# Patient Record
Sex: MALE | Race: WHITE
[De-identification: names, ages, dates, MRNs, and addresses within clinical notes are randomized per-mention and may not be internally consistent; named-entity substitution may affect disease eponyms.]

---

## 2019-06-12 ENCOUNTER — HOSPITAL ENCOUNTER (EMERGENCY)
Dept: HOSPITAL 91 - E/R | Age: 69
Discharge: HOME | End: 2019-06-12
Payer: MEDICARE

## 2019-06-12 ENCOUNTER — HOSPITAL ENCOUNTER (EMERGENCY)
Dept: HOSPITAL 10 - E/R | Age: 69
Discharge: HOME | End: 2019-06-12
Payer: MEDICARE

## 2019-06-12 VITALS
BODY MASS INDEX: 40.18 KG/M2 | BODY MASS INDEX: 40.18 KG/M2 | WEIGHT: 280.65 LBS | WEIGHT: 280.65 LBS | HEIGHT: 70 IN | HEIGHT: 70 IN

## 2019-06-12 VITALS — HEART RATE: 87 BPM | RESPIRATION RATE: 20 BRPM | DIASTOLIC BLOOD PRESSURE: 68 MMHG | SYSTOLIC BLOOD PRESSURE: 133 MMHG

## 2019-06-12 DIAGNOSIS — F10.920: Primary | ICD-10-CM

## 2019-06-12 PROCEDURE — 99283 EMERGENCY DEPT VISIT LOW MDM: CPT

## 2019-06-12 NOTE — ERD
ER Documentation


Chief Complaint


Chief Complaint





pt. BIBA RA wife called 911, concerns of existing liver cirhosis





HPI


69-year-old male brought in via EMS.  A Bangladeshi  was used.  The 


patient states that he was drinking alcohol today.  EMS reports that the wife 


called 911 because when the patient drinks alcohol at home he acts out and the 


wife did not want to deal with him.  Patient himself admits to drinking alcohol.


 He denies any falls, head injury, no hematemesis or melena.  The patient has no


complaints currently.





ROS


All systems reviewed and are negative except as per history of present illness.





Medications


Home Meds


Reported Medications


[Gabapentin]   No Conflict Check


   4/4/13


[Fluocinonide]   No Conflict Check


   4/4/13


[Flector]   No Conflict Check


   4/4/13


[Exforge]   No Conflict Check


   4/4/13


[Effient]   No Conflict Check


   4/4/13


[Doxazosin]   No Conflict Check


   4/4/13


[Dexilant]   No Conflict Check


   4/4/13


[Creon]   No Conflict Check


   4/4/13


[clotrimazole]   No Conflict Check


   4/4/13


[Captopril]   No Conflict Check


   4/4/13


[Apridra Solostar]   No Conflict Check


   4/4/13


[ammonium lactate]   No Conflict Check


   4/4/13


[hydralazine]   No Conflict Check


   4/4/13


[Hctz]   No Conflict Check


   4/4/13


[Humalog]   No Conflict Check


   4/4/13





Allergies


Allergies:  


Coded Allergies:  


     No Known Allergy (Unverified , 4/4/13)





PMhx/Soc


History of Surgery:  Yes (BYPASS)


Anesthesia Reaction:  No


Hx Neurological Disorder:  No


Hx Respiratory Disorders:  No


Hx Cardiac Disorders:  No


Hx Psychiatric Problems:  No


Hx Miscellaneous Medical Probl:  No


Hx Alcohol Use:  Yes (ETOH, Vodka)


Hx Substance Use:  No


Hx Tobacco Use:  No


Smoking Status:  Never smoker





FmHx


Family History:  No diabetes





Physical Exam


Vitals





Vital Signs


  Date      Temp  Pulse  Resp  B/P (MAP)   Pulse Ox  O2          O2 Flow    FiO2


Time                                                 Delivery    Rate


   6/12/19  98.2    105    20      126/69        94


     18:09                           (88)





Physical Exam


General: Well developed, well nourished, no acute distress


Head: Normocephalic, atraumatic.


Eyes: Pupils equally reactive, EOM intact


ENT: Moist mucous membranes


Neck: Supple, no lymphadenopathy


Respiratory: Lungs clear bilaterally, no distress


Cardiovascular: RRR, no murmurs, rubs, or gallops


Abdominal: Soft, non-tender, non-distended, no peritoneal signs


: Deferred


MSK: No edema, no unilateral swelling, 5/5 strength


Neurologic: Alert and oriented, moving all extremities, normal speech, no focal 


weakness, no cerebellar signs


Skin: No rash


Psych: Normal mood





Procedures/MDM


EKG, MONITORS, & DIAGNOSTIC IMAGING:


[None Required]





PROCEDURES:


[None Required]








MEDICAL DECISION MAKING:


The patient's presentation is consistent with mild acute alcohol intoxication





I have a much lower clinical concern for clinically significant traumatic brain 


injury, meningitis, significant electrolyte disturbance.





The patient's workup will include a medical screening examination as well as 


observation for sobriety.





The patient's presentation is most consistent with acute alcohol intoxication 


leading to acute encephalopathy.  The patient is protecting their airway.  The 


patient has no signs or symptoms concerning for impending respiratory failure 


and does not require intubation at this time.  The patient will require 


observation in the emergency room to allow for metabolization.





Once the patient is able to ambulate on their own accord, navigate the community


the patient can be safely discharged from the emergency room.





ER COURSE:


* The patient's wife arrived to the emergency room.  Using an  it was


  explained to her that the patient has mild intoxication but has no evidence of


  emergent medical condition.  The patient can be safely discharged.  She states


  that she does not want to take care of him and left the emergency room.


* The patient only has mild intoxication without signs of endorgan dysfunction 


  or acute medical emergency.


* We do not have a discharge plan.   have been consulted.








CONSULTATION:


[None]





DISPOSITION PLAN:


Pending  for disposition





Departure


Diagnosis:  


   Primary Impression:  


   Alcohol intoxication


   Complication of substance-induced condition:  uncomplicated  Qualified Codes:


    F10.920 - Alcohol use, unspecified with intoxication, uncomplicated


Condition:  Stable


Patient Instructions:  Alcohol Abuse


Referrals:  


COMMUNITY CLINICS


YOU HAVE RECEIVED A MEDICAL SCREENING EXAM AND THE RESULTS INDICATE THAT YOU DO 


NOT HAVE A CONDITION THAT REQUIRES URGENT TREATMENT IN THE EMERGENCY DEPARTMENT.





FURTHER EVALUATION AND TREATMENT OF YOUR CONDITION CAN WAIT UNTIL YOU ARE SEEN 


IN YOUR DOCTORS OFFICE WITHIN THE NEXT 1-2 DAYS. IT IS YOUR RESPONSIBILITY TO 


MAKE AN APPOINTMENT FOR FOLOW-UP CARE.





IF YOU HAVE A PRIMARY DOCTOR


--you should call your primary doctor and schedule an appointment





IF YOU DO NOT HAVE A PRIMARY DOCTOR YOU CAN CALL OUR PHYSICIAN REFERRAL HOTLINE 


AT


 [phone] 





IF YOU CAN NOT AFFORD TO SEE A PHYSICIAN YOU CAN CHOSE FROM THE FOLLOWING 


[facility] [phone] [address] [facility]





[facility] [phone] [address] [facility]





[facility] [phone] [address] [facility] [phone] [address] [facility] [phone] [address] [facility]. [facility] [phone] [address] [facility] [facility]


YOU HAVE RECEIVED A MEDICAL SCREENING EXAM AND THE RESULTS INDICATE THAT YOU DO 


NOT HAVE A CONDITION THAT REQUIRES URGENT TREATMENT IN THE EMERGENCY DEPARTMENT.





FURTHER EVALUATION AND TREATMENT OF YOUR CONDITION CAN WAIT UNTIL YOU ARE SEEN 


IN YOUR DOCTORS OFFICE WITHIN THE NEXT 1-2 DAYS. IT IS YOUR RESPONSIBILITY TO 


MAKE AN APPOINTMENT FOR FOLOW-UP CARE.





IF YOU HAVE A PRIMARY DOCTOR


--you should call your primary doctor and schedule and appointment





IF YOU DO NOT HAVE A PRIMARY DOCTOR YOU CAN CALL OUR PHYSICIAN REFERRAL HOTLINE 


AT [phone].





IF YOU CAN NOT AFFORD TO SEE A PHYSICIAN YOU CAN CHOSE FROM THE FOLLOWING [facility]:





[facility]


[address] [place] [address]





[facility]


[address] [place] [address]





[facility]


[address] [place] [address]





Additional Instructions:  


Call your primary care doctor TOMORROW for an appointment during the next 1 


WEEK.Tell the  that you were referred from this facility.See the doctor


sooner or return here if your  condition worsens before your appointment time.











IVANA TALBERT MD          Jun 12, 2019 19:24

## 2022-05-26 ENCOUNTER — OFFICE (OUTPATIENT)
Dept: URBAN - METROPOLITAN AREA CLINIC 45 | Facility: CLINIC | Age: 72
End: 2022-05-26

## 2022-05-26 VITALS
DIASTOLIC BLOOD PRESSURE: 75 MMHG | SYSTOLIC BLOOD PRESSURE: 121 MMHG | HEART RATE: 74 BPM | WEIGHT: 220 LBS | HEIGHT: 67 IN

## 2022-05-26 DIAGNOSIS — E66.9 OBESITY: ICD-10-CM

## 2022-05-26 DIAGNOSIS — B18.2: ICD-10-CM

## 2022-05-26 DIAGNOSIS — Z95.5 STENTED CORONARY ARTERY: ICD-10-CM

## 2022-05-26 DIAGNOSIS — K91.5 POSTCHOLECYSTECTOMY STATE: ICD-10-CM

## 2022-05-26 DIAGNOSIS — Z98.890: ICD-10-CM

## 2022-05-26 PROCEDURE — 99215 OFFICE O/P EST HI 40 MIN: CPT | Performed by: INTERNAL MEDICINE

## 2022-05-26 NOTE — SERVICEHPINOTES
Patient presents for follow-up of chronic liver disease and liver transplant performed at [facility] in January 2020.  Patient was previously briefly seen by me during hospitalization for decompensated liver disease with refractory ascites in 2019. He was subsequently referred by me for liver transplant evaluation.  Patient has been routinely followed at [facility] hepatology, but would like to establish local GI care as well due to difficulties with transplantation and language barrier. There has been recent lab work and abdominal MRI/MRCP performed in January of this year, showing no acute changes. He reportedly had previous colon screening by another local GI a few years ago. The patient has no family history of GI or liver cancer. Patient has been also followed by cardiologist due to prior history of stented coronary artery disease. He denies fever, nausea, vomiting, dysphagia, reflux, abdominal pain, change in bowel habits, constipation, diarrhea, rectal bleeding, melena, and significant change in weight. Denies shortness of breath and chest pain.

## 2023-02-21 ENCOUNTER — HOSPITAL ENCOUNTER (INPATIENT)
Dept: HOSPITAL 12 - ER | Age: 73
LOS: 6 days | Discharge: HOME HEALTH | DRG: 177 | End: 2023-02-27
Payer: MEDICARE

## 2023-02-21 VITALS — DIASTOLIC BLOOD PRESSURE: 63 MMHG | SYSTOLIC BLOOD PRESSURE: 126 MMHG

## 2023-02-21 VITALS — BODY MASS INDEX: 27.94 KG/M2 | WEIGHT: 178 LBS | HEIGHT: 67 IN

## 2023-02-21 DIAGNOSIS — I12.9: ICD-10-CM

## 2023-02-21 DIAGNOSIS — R16.1: ICD-10-CM

## 2023-02-21 DIAGNOSIS — D61.818: ICD-10-CM

## 2023-02-21 DIAGNOSIS — J12.82: ICD-10-CM

## 2023-02-21 DIAGNOSIS — E11.22: ICD-10-CM

## 2023-02-21 DIAGNOSIS — R50.81: ICD-10-CM

## 2023-02-21 DIAGNOSIS — B19.20: ICD-10-CM

## 2023-02-21 DIAGNOSIS — Z20.822: ICD-10-CM

## 2023-02-21 DIAGNOSIS — D70.9: ICD-10-CM

## 2023-02-21 DIAGNOSIS — Z94.4: ICD-10-CM

## 2023-02-21 DIAGNOSIS — I25.10: ICD-10-CM

## 2023-02-21 DIAGNOSIS — J15.6: ICD-10-CM

## 2023-02-21 DIAGNOSIS — N17.0: ICD-10-CM

## 2023-02-21 DIAGNOSIS — D50.9: ICD-10-CM

## 2023-02-21 DIAGNOSIS — U07.1: Primary | ICD-10-CM

## 2023-02-21 DIAGNOSIS — Z28.310: ICD-10-CM

## 2023-02-21 DIAGNOSIS — Z79.60: ICD-10-CM

## 2023-02-21 DIAGNOSIS — N18.9: ICD-10-CM

## 2023-02-21 DIAGNOSIS — Z79.84: ICD-10-CM

## 2023-02-21 DIAGNOSIS — J96.01: ICD-10-CM

## 2023-02-21 LAB
ALP SERPL-CCNC: 75 U/L (ref 50–136)
ALT SERPL W/O P-5'-P-CCNC: 29 U/L (ref 16–63)
AST SERPL-CCNC: 34 U/L (ref 15–37)
BILIRUB DIRECT SERPL-MCNC: 0.1 MG/DL (ref 0–0.2)
BILIRUB SERPL-MCNC: 0.3 MG/DL (ref 0.2–1)
BUN SERPL-MCNC: 39 MG/DL (ref 7–18)
CHLORIDE SERPL-SCNC: 106 MMOL/L (ref 98–107)
CK SERPL-CCNC: 274 U/L (ref 39–308)
CO2 SERPL-SCNC: 19 MMOL/L (ref 21–32)
CREAT SERPL-MCNC: 3 MG/DL (ref 0.6–1.3)
GLUCOSE SERPL-MCNC: 180 MG/DL (ref 74–106)
HCT VFR BLD AUTO: 28 % (ref 36.7–47.1)
LDH SERPL L TO P-CCNC: 223 U/L (ref 85–227)
LYMPHOCYTES NFR BLD MANUAL: 20 % (ref 20–40)
MCH RBC QN AUTO: 20.6 UUG (ref 23.8–33.4)
MCV RBC AUTO: 67.2 FL (ref 73–96.2)
MONOCYTES NFR BLD MANUAL: 5 % (ref 2–10)
NEUTS SEG NFR BLD MANUAL: 75 % (ref 42–75)
PLATELET # BLD AUTO: 132 K/UL (ref 152–348)
POTASSIUM SERPL-SCNC: 4 MMOL/L (ref 3.5–5.1)
WS STN SPEC: 7.1 G/DL (ref 6.4–8.2)

## 2023-02-21 PROCEDURE — A4663 DIALYSIS BLOOD PRESSURE CUFF: HCPCS

## 2023-02-21 PROCEDURE — G0378 HOSPITAL OBSERVATION PER HR: HCPCS

## 2023-02-21 RX ADMIN — FLUTICASONE FUROATE AND VILANTEROL TRIFENATATE SCH EACH: 100; 25 POWDER RESPIRATORY (INHALATION) at 21:25

## 2023-02-21 RX ADMIN — FLUTICASONE FUROATE AND VILANTEROL TRIFENATATE SCH EACH: 100; 25 POWDER RESPIRATORY (INHALATION) at 20:25

## 2023-02-21 RX ADMIN — SODIUM CHLORIDE PRN UNIT: 9 INJECTION, SOLUTION INTRAVENOUS at 23:11

## 2023-02-21 RX ADMIN — SODIUM CHLORIDE SCH MLS/HR: 0.9 INJECTION, SOLUTION INTRAVENOUS at 23:06

## 2023-02-21 RX ADMIN — ACETAMINOPHEN PRN MG: 325 TABLET ORAL at 22:35

## 2023-02-21 RX ADMIN — HEPARIN SODIUM SCH UNITS: 5000 INJECTION, SOLUTION INTRAVENOUS; SUBCUTANEOUS at 22:41

## 2023-02-21 RX ADMIN — DOCUSATE SODIUM SCH MG: 100 CAPSULE, LIQUID FILLED ORAL at 22:35

## 2023-02-21 RX ADMIN — Medication SCH EACH: at 22:38

## 2023-02-21 NOTE — NUR
Breo ellipta not given, meds not available.

-------------------------------------------------------------------------------

Addendum: 02/22/23 at 0114 by MADHAV GARSIA RN

-------------------------------------------------------------------------------

Breo ellipta not given, meds not available. charting in error.

## 2023-02-21 NOTE — NUR
Admitted patient to tele floor under the care of Dr Morgan, patient alert oriented, 
speak Vincentian little English, but understand English,,patient is on droplet precaution, 
covid 19 positive, on oxygen 5liters via NC sat 98%, with slight restlessness, but able to 
calm down when prompted, patient continent uses urinal for bladder elimination. Patient has 
no complain of pain, no sob no chest patient on tele, sinus rhythm 89 heart rate.  Patient 
has healed incision scar across abdomen. patient has temp 103. oral.

## 2023-02-21 NOTE — NUR
Pt arrived in the ED d/t SOB BIBA from home. SOB started 6-10 days ago. Hx of 
HTN, DM and liver transplant. Came with 3L of O2  and 20G of IV at the R index 
finger PTA. Pt has no covid vaccination.

## 2023-02-21 NOTE — NUR
Satish, liver transplant nurse, informed that the pt takes Tacrolimus BID. Call 
back # [phone], on call coordinator phone # [phone]. Will notify 
the incoming nurse.

## 2023-02-21 NOTE — NUR
Patient awake alert, no sob no chest paint, 98.9 oral, tylenol 650mg plus cooling measures 
effective, , cont to monitor.

## 2023-02-22 VITALS — SYSTOLIC BLOOD PRESSURE: 109 MMHG | DIASTOLIC BLOOD PRESSURE: 55 MMHG

## 2023-02-22 VITALS — SYSTOLIC BLOOD PRESSURE: 124 MMHG | DIASTOLIC BLOOD PRESSURE: 80 MMHG

## 2023-02-22 VITALS — DIASTOLIC BLOOD PRESSURE: 45 MMHG | SYSTOLIC BLOOD PRESSURE: 113 MMHG

## 2023-02-22 VITALS — DIASTOLIC BLOOD PRESSURE: 51 MMHG | SYSTOLIC BLOOD PRESSURE: 115 MMHG

## 2023-02-22 VITALS — SYSTOLIC BLOOD PRESSURE: 128 MMHG | DIASTOLIC BLOOD PRESSURE: 53 MMHG

## 2023-02-22 LAB
ALP SERPL-CCNC: 106 U/L (ref 50–136)
ALT SERPL W/O P-5'-P-CCNC: 26 U/L (ref 16–63)
APPEARANCE UR: CLEAR
AST SERPL-CCNC: 42 U/L (ref 15–37)
BILIRUB SERPL-MCNC: 0.3 MG/DL (ref 0.2–1)
BILIRUB UR QL STRIP: NEGATIVE
BUN SERPL-MCNC: 36 MG/DL (ref 7–18)
CHLORIDE SERPL-SCNC: 106 MMOL/L (ref 98–107)
CO2 SERPL-SCNC: 20 MMOL/L (ref 21–32)
COLOR UR: YELLOW
CREAT SERPL-MCNC: 2.4 MG/DL (ref 0.6–1.3)
CREAT UR-MCNC: 96 MG/DL (ref 30–125)
DEPRECATED SQUAMOUS URNS QL MICRO: (no result) /HPF
FERRITIN SERPL-MCNC: 73 NG/ML (ref 26–388)
GLUCOSE SERPL-MCNC: 154 MG/DL (ref 74–106)
GLUCOSE UR STRIP-MCNC: (no result) MG/DL
HCT VFR BLD AUTO: 25.2 % (ref 36.7–47.1)
HGB UR QL STRIP: (no result)
IRON SERPL-MCNC: 8 UG/DL (ref 50–175)
KETONES UR STRIP-MCNC: NEGATIVE MG/DL
LEUKOCYTE ESTERASE UR QL STRIP: NEGATIVE
LYMPHOCYTES NFR BLD MANUAL: 32 % (ref 20–40)
MCH RBC QN AUTO: 20.9 UUG (ref 23.8–33.4)
MCV RBC AUTO: 67.2 FL (ref 73–96.2)
MONOCYTES NFR BLD MANUAL: 8 % (ref 2–10)
NEUTS SEG NFR BLD MANUAL: 60 % (ref 42–75)
NITRITE UR QL STRIP: NEGATIVE
PH UR STRIP: 5.5 [PH] (ref 5–8)
PHOSPHATE SERPL-MCNC: 3.2 MG/DL (ref 2.5–4.9)
PLATELET # BLD AUTO: 130 K/UL (ref 152–348)
POTASSIUM SERPL-SCNC: 3.6 MMOL/L (ref 3.5–5.1)
PROT UR-MCNC: 97.6 MG/DL
RBC #/AREA URNS HPF: (no result) /HPF (ref 0–3)
RHEUMATOID FACT SER QL LA: NEGATIVE
SP GR UR STRIP: 1.01 (ref 1–1.03)
TSH SERPL DL<=0.005 MIU/L-ACNC: 0.27 MIU/ML (ref 0.36–3.74)
UROBILINOGEN UR STRIP-MCNC: 0.2 E.U./DL
WBC #/AREA URNS HPF: (no result) /HPF
WBC #/AREA URNS HPF: (no result) /HPF (ref 0–3)
WS STN SPEC: 6.7 G/DL (ref 6.4–8.2)

## 2023-02-22 PROCEDURE — XW033E5 INTRODUCTION OF REMDESIVIR ANTI-INFECTIVE INTO PERIPHERAL VEIN, PERCUTANEOUS APPROACH, NEW TECHNOLOGY GROUP 5: ICD-10-PCS

## 2023-02-22 RX ADMIN — FLUTICASONE FUROATE AND VILANTEROL TRIFENATATE SCH EACH: 100; 25 POWDER RESPIRATORY (INHALATION) at 13:52

## 2023-02-22 RX ADMIN — ACETAMINOPHEN PRN MG: 325 TABLET ORAL at 20:39

## 2023-02-22 RX ADMIN — QUETIAPINE SCH MG: 200 TABLET, FILM COATED ORAL at 17:33

## 2023-02-22 RX ADMIN — URSODIOL SCH MG: 300 CAPSULE ORAL at 17:34

## 2023-02-22 RX ADMIN — ACETAMINOPHEN PRN MG: 325 TABLET ORAL at 12:11

## 2023-02-22 RX ADMIN — DOCUSATE SODIUM SCH MG: 100 CAPSULE, LIQUID FILLED ORAL at 10:33

## 2023-02-22 RX ADMIN — SODIUM CHLORIDE PRN UNIT: 9 INJECTION, SOLUTION INTRAVENOUS at 12:12

## 2023-02-22 RX ADMIN — Medication SCH MG: at 17:33

## 2023-02-22 RX ADMIN — TACROLIMUS SCH MG: 0.5 CAPSULE, GELATIN COATED ORAL at 20:40

## 2023-02-22 RX ADMIN — Medication SCH EACH: at 11:55

## 2023-02-22 RX ADMIN — SODIUM CHLORIDE PRN UNIT: 9 INJECTION, SOLUTION INTRAVENOUS at 22:09

## 2023-02-22 RX ADMIN — HEPARIN SODIUM SCH UNITS: 5000 INJECTION, SOLUTION INTRAVENOUS; SUBCUTANEOUS at 17:37

## 2023-02-22 RX ADMIN — SODIUM CHLORIDE SCH MLS/HR: 0.9 INJECTION, SOLUTION INTRAVENOUS at 10:00

## 2023-02-22 RX ADMIN — Medication SCH EACH: at 16:30

## 2023-02-22 RX ADMIN — TRAZODONE HYDROCHLORIDE SCH MG: 50 TABLET ORAL at 20:40

## 2023-02-22 RX ADMIN — Medication SCH EACH: at 06:23

## 2023-02-22 RX ADMIN — DOCUSATE SODIUM SCH MG: 100 CAPSULE, LIQUID FILLED ORAL at 17:33

## 2023-02-22 RX ADMIN — DEXTROSE SCH MLS/HR: 50 INJECTION, SOLUTION INTRAVENOUS at 19:11

## 2023-02-22 RX ADMIN — FERROUS SULFATE TAB 325 MG (65 MG ELEMENTAL FE) SCH MG: 325 (65 FE) TAB at 13:52

## 2023-02-22 RX ADMIN — FERROUS SULFATE TAB 325 MG (65 MG ELEMENTAL FE) SCH MG: 325 (65 FE) TAB at 20:40

## 2023-02-22 RX ADMIN — PANCRELIPASE LIPASE, PANCRELIPASE AMYLASE, AND PANCRELIPASE PROTEASE SCH EACH: 4200; 24600; 14200 CAPSULE, DELAYED RELEASE ORAL at 17:34

## 2023-02-22 RX ADMIN — Medication SCH EACH: at 21:00

## 2023-02-22 RX ADMIN — HEPARIN SODIUM SCH UNITS: 5000 INJECTION, SOLUTION INTRAVENOUS; SUBCUTANEOUS at 10:34

## 2023-02-22 RX ADMIN — CLOPIDOGREL BISULFATE SCH MG: 75 TABLET ORAL at 10:33

## 2023-02-22 RX ADMIN — DEXTROSE SCH MLS/HR: 50 INJECTION, SOLUTION INTRAVENOUS at 20:40

## 2023-02-22 RX ADMIN — TACROLIMUS SCH MG: 0.5 CAPSULE, GELATIN COATED ORAL at 10:32

## 2023-02-22 NOTE — NUR
patient received in bed. v/s taken and noted with elevated temp of 100.2. acetaminophen 
given as ordered. after one hour rechecked v/s and noted with temp 98.2. 

2045 patient noted with episode of elevated hr 138 and it went down to 100 without any 
medication given. resident denies any pain or discomfort at this time. 

0000 patient c/o generalized body pain 07/10. iv morphine given at 0016 and will reassess 
pain after 30 minutes.

## 2023-02-22 NOTE — NUR
PATIENT STILL SPIKING TEMP 101.6, GIVING COOLING MEASURES, NOTIFY DR BARRIOS REGARDING 
THE SPIKING TEMP, NOTIFY MD ALSO REGARDING [facility]HS[facility] RN LIVER TRANSPLANT NURSE REQUEST TO 
CONTINUE TACROLIMUS BID, MD QUINTANAEY THE ORDER ONCE THE DOSE OF THE MEDS IS VERIFIED. CALLED AND 
LEFT MEASSAGE TO MOHS[facility], TO CALL ME OR MARIS CHARGE NURSE ABOUT THE MATTER.

## 2023-02-23 VITALS — SYSTOLIC BLOOD PRESSURE: 119 MMHG | DIASTOLIC BLOOD PRESSURE: 43 MMHG

## 2023-02-23 VITALS — DIASTOLIC BLOOD PRESSURE: 56 MMHG | SYSTOLIC BLOOD PRESSURE: 125 MMHG

## 2023-02-23 VITALS — SYSTOLIC BLOOD PRESSURE: 115 MMHG | DIASTOLIC BLOOD PRESSURE: 56 MMHG

## 2023-02-23 VITALS — SYSTOLIC BLOOD PRESSURE: 125 MMHG | DIASTOLIC BLOOD PRESSURE: 56 MMHG

## 2023-02-23 VITALS — DIASTOLIC BLOOD PRESSURE: 65 MMHG | SYSTOLIC BLOOD PRESSURE: 147 MMHG

## 2023-02-23 LAB
ALBUMIN SERPL ELPH-MCNC: 3.2 G/DL (ref 2.9–4.4)
ALBUMIN/GLOB SERPL: 1.2 {RATIO} (ref 0.7–1.7)
ALP SERPL-CCNC: 97 U/L (ref 50–136)
ALPHA1 GLOB SERPL ELPH-MCNC: 0.2 G/DL (ref 0–0.4)
ALPHA2 GLOB SERPL ELPH-MCNC: 0.8 G/DL (ref 0.4–1)
ALT SERPL W/O P-5'-P-CCNC: 22 U/L (ref 16–63)
AST SERPL-CCNC: 29 U/L (ref 15–37)
B-GLOBULIN SERPL ELPH-MCNC: 0.9 G/DL (ref 0.7–1.3)
BILIRUB SERPL-MCNC: 0.3 MG/DL (ref 0.2–1)
BUN SERPL-MCNC: 27 MG/DL (ref 7–18)
CHLORIDE SERPL-SCNC: 108 MMOL/L (ref 98–107)
CK SERPL-CCNC: 133 U/L (ref 39–308)
CO2 SERPL-SCNC: 20 MMOL/L (ref 21–32)
CREAT SERPL-MCNC: 1.7 MG/DL (ref 0.6–1.3)
DSDNA AB SER-ACNC: 1 IU/ML (ref 0–9)
ENA SCL70 AB SER-ACNC: <0.2 AI (ref 0–0.9)
ENA SM AB SER-ACNC: <0.2 AI (ref 0–0.9)
ENA SS-A AB SER-ACNC: <0.2 AI (ref 0–0.9)
ENA SS-B AB SER-ACNC: <0.2 AI (ref 0–0.9)
GAMMA GLOB SERPL ELPH-MCNC: 0.7 G/DL (ref 0.4–1.8)
GLOBULIN SER CALC-MCNC: 2.6 G/DL (ref 2.2–3.9)
GLUCOSE SERPL-MCNC: 166 MG/DL (ref 74–106)
HCT VFR BLD AUTO: 25.6 % (ref 36.7–47.1)
IGG SERPL-MCNC: 679 MG/DL (ref 603–1613)
IGM SERPL-MCNC: 92 MG/DL (ref 15–143)
MAGNESIUM SERPL-MCNC: 1.6 MG/DL (ref 1.8–2.4)
MCH RBC QN AUTO: 21 UUG (ref 23.8–33.4)
MCV RBC AUTO: 66.2 FL (ref 73–96.2)
PHOSPHATE SERPL-MCNC: 3.1 MG/DL (ref 2.5–4.9)
PLATELET # BLD AUTO: 146 K/UL (ref 152–348)
POTASSIUM SERPL-SCNC: 3.5 MMOL/L (ref 3.5–5.1)
WS STN SPEC: 6.8 G/DL (ref 6.4–8.2)

## 2023-02-23 PROCEDURE — B547ZZA ULTRASONOGRAPHY OF LEFT SUBCLAVIAN VEIN, GUIDANCE: ICD-10-PCS

## 2023-02-23 PROCEDURE — 05H633Z INSERTION OF INFUSION DEVICE INTO LEFT SUBCLAVIAN VEIN, PERCUTANEOUS APPROACH: ICD-10-PCS

## 2023-02-23 RX ADMIN — FERROUS SULFATE TAB 325 MG (65 MG ELEMENTAL FE) SCH MG: 325 (65 FE) TAB at 09:45

## 2023-02-23 RX ADMIN — URSODIOL SCH MG: 300 CAPSULE ORAL at 17:39

## 2023-02-23 RX ADMIN — Medication SCH EACH: at 07:03

## 2023-02-23 RX ADMIN — FERROUS SULFATE TAB 325 MG (65 MG ELEMENTAL FE) SCH MG: 325 (65 FE) TAB at 20:47

## 2023-02-23 RX ADMIN — URSODIOL SCH MG: 300 CAPSULE ORAL at 13:39

## 2023-02-23 RX ADMIN — SODIUM CHLORIDE PRN UNIT: 9 INJECTION, SOLUTION INTRAVENOUS at 18:15

## 2023-02-23 RX ADMIN — Medication SCH MG: at 09:46

## 2023-02-23 RX ADMIN — DEXAMETHASONE SODIUM PHOSPHATE SCH MG: 4 INJECTION, SOLUTION INTRAMUSCULAR; INTRAVENOUS at 09:44

## 2023-02-23 RX ADMIN — GABAPENTIN SCH MG: 100 CAPSULE ORAL at 09:45

## 2023-02-23 RX ADMIN — Medication PRN MG: at 00:16

## 2023-02-23 RX ADMIN — Medication SCH MG: at 09:45

## 2023-02-23 RX ADMIN — Medication SCH MG: at 17:39

## 2023-02-23 RX ADMIN — Medication SCH MG: at 13:39

## 2023-02-23 RX ADMIN — QUETIAPINE SCH MG: 200 TABLET, FILM COATED ORAL at 09:44

## 2023-02-23 RX ADMIN — DEXTROSE SCH MLS/HR: 50 INJECTION, SOLUTION INTRAVENOUS at 20:46

## 2023-02-23 RX ADMIN — SODIUM CHLORIDE PRN UNIT: 9 INJECTION, SOLUTION INTRAVENOUS at 11:12

## 2023-02-23 RX ADMIN — DOCUSATE SODIUM SCH MG: 100 CAPSULE, LIQUID FILLED ORAL at 09:46

## 2023-02-23 RX ADMIN — HEPARIN SODIUM SCH UNITS: 5000 INJECTION, SOLUTION INTRAVENOUS; SUBCUTANEOUS at 09:00

## 2023-02-23 RX ADMIN — DEXTROSE SCH MLS/HR: 50 INJECTION, SOLUTION INTRAVENOUS at 09:00

## 2023-02-23 RX ADMIN — FLUTICASONE FUROATE AND VILANTEROL TRIFENATATE SCH EACH: 100; 25 POWDER RESPIRATORY (INHALATION) at 10:03

## 2023-02-23 RX ADMIN — PANCRELIPASE LIPASE, PANCRELIPASE AMYLASE, AND PANCRELIPASE PROTEASE SCH EACH: 4200; 24600; 14200 CAPSULE, DELAYED RELEASE ORAL at 12:00

## 2023-02-23 RX ADMIN — URSODIOL SCH MG: 300 CAPSULE ORAL at 09:00

## 2023-02-23 RX ADMIN — DEXTROSE SCH MLS/HR: 50 INJECTION, SOLUTION INTRAVENOUS at 13:40

## 2023-02-23 RX ADMIN — HEPARIN SODIUM SCH UNITS: 5000 INJECTION, SOLUTION INTRAVENOUS; SUBCUTANEOUS at 17:42

## 2023-02-23 RX ADMIN — DOCUSATE SODIUM SCH MG: 100 CAPSULE, LIQUID FILLED ORAL at 17:39

## 2023-02-23 RX ADMIN — ALLOPURINOL SCH MG: 100 TABLET ORAL at 09:45

## 2023-02-23 RX ADMIN — Medication SCH EACH: at 21:21

## 2023-02-23 RX ADMIN — DEXTROSE SCH MLS/HR: 50 INJECTION, SOLUTION INTRAVENOUS at 18:19

## 2023-02-23 RX ADMIN — TACROLIMUS SCH MG: 0.5 CAPSULE, GELATIN COATED ORAL at 09:47

## 2023-02-23 RX ADMIN — Medication SCH EACH: at 11:34

## 2023-02-23 RX ADMIN — SODIUM CHLORIDE SCH MLS/HR: 9 INJECTION, SOLUTION INTRAVENOUS at 19:47

## 2023-02-23 RX ADMIN — Medication SCH EACH: at 17:00

## 2023-02-23 RX ADMIN — TRAZODONE HYDROCHLORIDE SCH MG: 50 TABLET ORAL at 20:47

## 2023-02-23 RX ADMIN — QUETIAPINE SCH MG: 200 TABLET, FILM COATED ORAL at 17:39

## 2023-02-23 RX ADMIN — PANCRELIPASE LIPASE, PANCRELIPASE AMYLASE, AND PANCRELIPASE PROTEASE SCH EACH: 4200; 24600; 14200 CAPSULE, DELAYED RELEASE ORAL at 08:30

## 2023-02-23 RX ADMIN — SODIUM CHLORIDE PRN UNIT: 9 INJECTION, SOLUTION INTRAVENOUS at 21:24

## 2023-02-23 RX ADMIN — TACROLIMUS SCH MG: 0.5 CAPSULE, GELATIN COATED ORAL at 20:48

## 2023-02-23 RX ADMIN — CLOPIDOGREL BISULFATE SCH MG: 75 TABLET ORAL at 09:51

## 2023-02-23 RX ADMIN — Medication SCH MG: at 09:30

## 2023-02-23 RX ADMIN — PANTOPRAZOLE SODIUM SCH MG: 40 TABLET, DELAYED RELEASE ORAL at 06:06

## 2023-02-23 RX ADMIN — PANCRELIPASE LIPASE, PANCRELIPASE AMYLASE, AND PANCRELIPASE PROTEASE SCH EACH: 4200; 24600; 14200 CAPSULE, DELAYED RELEASE ORAL at 17:39

## 2023-02-23 NOTE — NUR
patient in bed slept well and remained stable. SR on tele with hr of 89. iv ATB was given no 
adverse effect noted. patient refused iv line. no sob or resp distress noted. unable to colp

-------------------------------------------------------------------------------

Addendum: 02/23/23 at 0635 by RITU NUGENT RN

-------------------------------------------------------------------------------

was unable to collect sputum sample. will endorse to day shift.

## 2023-02-23 NOTE — NUR
SPOKE WITH PT'S JERICHO WYNN REGARDING PT'S HOME MED/MIRABEGRON WHO STATES SHE WILL BRING 
THIS MEDICATION TO US LATER ON TODAY AROUND 1930

## 2023-02-23 NOTE — NUR
0000 patient started npo mid- night for abdominal ultrasound in am

0456 patient in his bed sleeping comfortable. SR with hr 89 on tele. noted with iv line 
dislodged. per patient he does not need iv line right now and he wants the iv line to be 
inserted in morning. Risks and Benefits Explained to patient. patient still refusing. no iv 
fluids or iv ATB due. will endorse to morning shift.

## 2023-02-24 VITALS — DIASTOLIC BLOOD PRESSURE: 64 MMHG | SYSTOLIC BLOOD PRESSURE: 106 MMHG

## 2023-02-24 VITALS — DIASTOLIC BLOOD PRESSURE: 57 MMHG | SYSTOLIC BLOOD PRESSURE: 126 MMHG

## 2023-02-24 VITALS — SYSTOLIC BLOOD PRESSURE: 122 MMHG | DIASTOLIC BLOOD PRESSURE: 68 MMHG

## 2023-02-24 VITALS — DIASTOLIC BLOOD PRESSURE: 88 MMHG | SYSTOLIC BLOOD PRESSURE: 137 MMHG

## 2023-02-24 VITALS — SYSTOLIC BLOOD PRESSURE: 137 MMHG | DIASTOLIC BLOOD PRESSURE: 65 MMHG

## 2023-02-24 LAB
ALBUMIN SERPL ELPH-MCNC: 2.9 G/DL (ref 2.9–4.4)
ALBUMIN/GLOB SERPL: 1 {RATIO} (ref 0.7–1.7)
ALP SERPL-CCNC: 97 U/L (ref 50–136)
ALPHA1 GLOB SERPL ELPH-MCNC: 0.3 G/DL (ref 0–0.4)
ALPHA2 GLOB SERPL ELPH-MCNC: 0.8 G/DL (ref 0.4–1)
ALT SERPL W/O P-5'-P-CCNC: 19 U/L (ref 16–63)
AST SERPL-CCNC: 20 U/L (ref 15–37)
B-GLOBULIN SERPL ELPH-MCNC: 0.9 G/DL (ref 0.7–1.3)
BILIRUB DIRECT SERPL-MCNC: 0.1 MG/DL (ref 0–0.2)
BILIRUB SERPL-MCNC: 0.2 MG/DL (ref 0.2–1)
BUN SERPL-MCNC: 23 MG/DL (ref 7–18)
CHLORIDE SERPL-SCNC: 107 MMOL/L (ref 98–107)
CO2 SERPL-SCNC: 21 MMOL/L (ref 21–32)
CREAT SERPL-MCNC: 1.8 MG/DL (ref 0.6–1.3)
GAMMA GLOB SERPL ELPH-MCNC: 0.8 G/DL (ref 0.4–1.8)
GLOBULIN SER CALC-MCNC: 2.8 G/DL (ref 2.2–3.9)
GLUCOSE SERPL-MCNC: 233 MG/DL (ref 74–106)
HCT VFR BLD AUTO: 27.1 % (ref 36.7–47.1)
LYMPHOCYTES NFR BLD MANUAL: 9 % (ref 20–40)
MAGNESIUM SERPL-MCNC: 2.1 MG/DL (ref 1.8–2.4)
MCH RBC QN AUTO: 20.7 UUG (ref 23.8–33.4)
MCV RBC AUTO: 66 FL (ref 73–96.2)
MONOCYTES NFR BLD MANUAL: 1 % (ref 2–10)
NEUTS SEG NFR BLD MANUAL: 90 % (ref 42–75)
PLATELET # BLD AUTO: 204 K/UL (ref 152–348)
POTASSIUM SERPL-SCNC: 3.2 MMOL/L (ref 3.5–5.1)
WS STN SPEC: 7.1 G/DL (ref 6.4–8.2)

## 2023-02-24 PROCEDURE — B547ZZA ULTRASONOGRAPHY OF LEFT SUBCLAVIAN VEIN, GUIDANCE: ICD-10-PCS

## 2023-02-24 PROCEDURE — 05H633Z INSERTION OF INFUSION DEVICE INTO LEFT SUBCLAVIAN VEIN, PERCUTANEOUS APPROACH: ICD-10-PCS

## 2023-02-24 RX ADMIN — ALLOPURINOL SCH MG: 100 TABLET ORAL at 09:47

## 2023-02-24 RX ADMIN — PANCRELIPASE LIPASE, PANCRELIPASE AMYLASE, AND PANCRELIPASE PROTEASE SCH EACH: 4200; 24600; 14200 CAPSULE, DELAYED RELEASE ORAL at 14:27

## 2023-02-24 RX ADMIN — SODIUM CHLORIDE SCH MLS/HR: 9 INJECTION, SOLUTION INTRAVENOUS at 20:05

## 2023-02-24 RX ADMIN — DOCUSATE SODIUM SCH MG: 100 CAPSULE, LIQUID FILLED ORAL at 09:47

## 2023-02-24 RX ADMIN — DEXTROSE SCH MLS/HR: 50 INJECTION, SOLUTION INTRAVENOUS at 17:32

## 2023-02-24 RX ADMIN — FERROUS SULFATE TAB 325 MG (65 MG ELEMENTAL FE) SCH MG: 325 (65 FE) TAB at 09:47

## 2023-02-24 RX ADMIN — DEXTROSE SCH MLS/HR: 50 INJECTION, SOLUTION INTRAVENOUS at 10:26

## 2023-02-24 RX ADMIN — Medication SCH EA: at 10:16

## 2023-02-24 RX ADMIN — Medication SCH MG: at 09:48

## 2023-02-24 RX ADMIN — SODIUM CHLORIDE PRN UNIT: 9 INJECTION, SOLUTION INTRAVENOUS at 17:39

## 2023-02-24 RX ADMIN — DOCUSATE SODIUM SCH MG: 100 CAPSULE, LIQUID FILLED ORAL at 17:32

## 2023-02-24 RX ADMIN — FERROUS SULFATE TAB 325 MG (65 MG ELEMENTAL FE) SCH MG: 325 (65 FE) TAB at 20:53

## 2023-02-24 RX ADMIN — Medication SCH EACH: at 11:30

## 2023-02-24 RX ADMIN — Medication SCH MG: at 17:32

## 2023-02-24 RX ADMIN — FLUTICASONE FUROATE AND VILANTEROL TRIFENATATE SCH EACH: 100; 25 POWDER RESPIRATORY (INHALATION) at 09:00

## 2023-02-24 RX ADMIN — Medication SCH MG: at 09:50

## 2023-02-24 RX ADMIN — Medication SCH MG: at 12:04

## 2023-02-24 RX ADMIN — DEXTROSE SCH MLS/HR: 50 INJECTION, SOLUTION INTRAVENOUS at 20:44

## 2023-02-24 RX ADMIN — URSODIOL SCH MG: 300 CAPSULE ORAL at 09:59

## 2023-02-24 RX ADMIN — DEXTROSE SCH MLS/HR: 50 INJECTION, SOLUTION INTRAVENOUS at 00:27

## 2023-02-24 RX ADMIN — SODIUM CHLORIDE PRN UNIT: 9 INJECTION, SOLUTION INTRAVENOUS at 20:54

## 2023-02-24 RX ADMIN — QUETIAPINE SCH MG: 200 TABLET, FILM COATED ORAL at 09:49

## 2023-02-24 RX ADMIN — Medication SCH EACH: at 16:30

## 2023-02-24 RX ADMIN — DEXAMETHASONE SODIUM PHOSPHATE SCH MG: 4 INJECTION, SOLUTION INTRAMUSCULAR; INTRAVENOUS at 09:47

## 2023-02-24 RX ADMIN — HEPARIN SODIUM SCH UNITS: 5000 INJECTION, SOLUTION INTRAVENOUS; SUBCUTANEOUS at 09:54

## 2023-02-24 RX ADMIN — Medication SCH MG: at 09:49

## 2023-02-24 RX ADMIN — URSODIOL SCH MG: 300 CAPSULE ORAL at 17:41

## 2023-02-24 RX ADMIN — SODIUM CHLORIDE PRN UNIT: 9 INJECTION, SOLUTION INTRAVENOUS at 13:47

## 2023-02-24 RX ADMIN — PANTOPRAZOLE SODIUM SCH MG: 40 TABLET, DELAYED RELEASE ORAL at 06:31

## 2023-02-24 RX ADMIN — GABAPENTIN SCH MG: 100 CAPSULE ORAL at 09:48

## 2023-02-24 RX ADMIN — SODIUM CHLORIDE PRN UNIT: 9 INJECTION, SOLUTION INTRAVENOUS at 10:02

## 2023-02-24 RX ADMIN — QUETIAPINE SCH MG: 200 TABLET, FILM COATED ORAL at 17:31

## 2023-02-24 RX ADMIN — TACROLIMUS SCH MG: 0.5 CAPSULE, GELATIN COATED ORAL at 09:49

## 2023-02-24 RX ADMIN — PANCRELIPASE LIPASE, PANCRELIPASE AMYLASE, AND PANCRELIPASE PROTEASE SCH EACH: 4200; 24600; 14200 CAPSULE, DELAYED RELEASE ORAL at 09:50

## 2023-02-24 RX ADMIN — PANCRELIPASE LIPASE, PANCRELIPASE AMYLASE, AND PANCRELIPASE PROTEASE SCH EACH: 4200; 24600; 14200 CAPSULE, DELAYED RELEASE ORAL at 17:32

## 2023-02-24 RX ADMIN — Medication SCH EA: at 10:26

## 2023-02-24 RX ADMIN — TACROLIMUS SCH MG: 0.5 CAPSULE, GELATIN COATED ORAL at 20:53

## 2023-02-24 RX ADMIN — CLOPIDOGREL BISULFATE SCH MG: 75 TABLET ORAL at 09:47

## 2023-02-24 RX ADMIN — URSODIOL SCH MG: 300 CAPSULE ORAL at 12:06

## 2023-02-24 RX ADMIN — HEPARIN SODIUM SCH UNITS: 5000 INJECTION, SOLUTION INTRAVENOUS; SUBCUTANEOUS at 17:40

## 2023-02-24 RX ADMIN — Medication SCH EACH: at 06:38

## 2023-02-24 RX ADMIN — TRAZODONE HYDROCHLORIDE SCH MG: 50 TABLET ORAL at 20:53

## 2023-02-24 RX ADMIN — Medication SCH EACH: at 20:54

## 2023-02-24 RX ADMIN — DEXTROSE SCH MLS/HR: 50 INJECTION, SOLUTION INTRAVENOUS at 12:04

## 2023-02-24 NOTE — NUR
Patient awake and  alert laying in bed, no sob no chest pain or distress noted. report off 
to pm nurse

## 2023-02-25 VITALS — DIASTOLIC BLOOD PRESSURE: 62 MMHG | SYSTOLIC BLOOD PRESSURE: 156 MMHG

## 2023-02-25 VITALS — DIASTOLIC BLOOD PRESSURE: 56 MMHG | SYSTOLIC BLOOD PRESSURE: 124 MMHG

## 2023-02-25 VITALS — SYSTOLIC BLOOD PRESSURE: 155 MMHG | DIASTOLIC BLOOD PRESSURE: 81 MMHG

## 2023-02-25 VITALS — DIASTOLIC BLOOD PRESSURE: 65 MMHG | SYSTOLIC BLOOD PRESSURE: 122 MMHG

## 2023-02-25 VITALS — SYSTOLIC BLOOD PRESSURE: 137 MMHG | DIASTOLIC BLOOD PRESSURE: 75 MMHG

## 2023-02-25 VITALS — DIASTOLIC BLOOD PRESSURE: 66 MMHG | SYSTOLIC BLOOD PRESSURE: 133 MMHG

## 2023-02-25 LAB
ALP SERPL-CCNC: 104 U/L (ref 50–136)
ALT SERPL W/O P-5'-P-CCNC: 18 U/L (ref 16–63)
AST SERPL-CCNC: 18 U/L (ref 15–37)
BASOPHILS NFR BLD MANUAL: 0 % (ref 0–2)
BILIRUB DIRECT SERPL-MCNC: 0.1 MG/DL (ref 0–0.2)
BILIRUB SERPL-MCNC: 0.3 MG/DL (ref 0.2–1)
BUN SERPL-MCNC: 21 MG/DL (ref 7–18)
CHLORIDE SERPL-SCNC: 110 MMOL/L (ref 98–107)
CO2 SERPL-SCNC: 22 MMOL/L (ref 21–32)
CREAT SERPL-MCNC: 1.5 MG/DL (ref 0.6–1.3)
EOSINOPHIL NFR BLD MANUAL: 1 % (ref 0–8)
GLUCOSE SERPL-MCNC: 189 MG/DL (ref 74–106)
HCT VFR BLD AUTO: 24.4 % (ref 36.7–47.1)
LYMPHOCYTES NFR BLD MANUAL: 14 % (ref 20–40)
MCH RBC QN AUTO: 21.2 UUG (ref 23.8–33.4)
MCV RBC AUTO: 65.4 FL (ref 73–96.2)
MONOCYTES NFR BLD MANUAL: 4 % (ref 2–10)
NEUTS BAND NFR BLD MANUAL: 0 % (ref 0–10)
NEUTS SEG NFR BLD MANUAL: 81 % (ref 42–75)
PLATELET # BLD AUTO: 168 K/UL (ref 152–348)
POTASSIUM SERPL-SCNC: 3.7 MMOL/L (ref 3.5–5.1)
WS STN SPEC: 6.7 G/DL (ref 6.4–8.2)

## 2023-02-25 RX ADMIN — SODIUM CHLORIDE SCH MLS/HR: 9 INJECTION, SOLUTION INTRAVENOUS at 13:27

## 2023-02-25 RX ADMIN — DOXYCYCLINE HYCLATE SCH MG: 100 TABLET, COATED ORAL at 21:39

## 2023-02-25 RX ADMIN — HEPARIN SODIUM SCH UNITS: 5000 INJECTION, SOLUTION INTRAVENOUS; SUBCUTANEOUS at 17:28

## 2023-02-25 RX ADMIN — Medication SCH MG: at 18:41

## 2023-02-25 RX ADMIN — SODIUM CHLORIDE PRN UNIT: 9 INJECTION, SOLUTION INTRAVENOUS at 22:26

## 2023-02-25 RX ADMIN — FLUTICASONE FUROATE AND VILANTEROL TRIFENATATE SCH EACH: 100; 25 POWDER RESPIRATORY (INHALATION) at 09:11

## 2023-02-25 RX ADMIN — Medication SCH EACH: at 11:30

## 2023-02-25 RX ADMIN — ACETAMINOPHEN PRN MG: 325 TABLET ORAL at 05:12

## 2023-02-25 RX ADMIN — SODIUM CHLORIDE PRN UNIT: 9 INJECTION, SOLUTION INTRAVENOUS at 13:21

## 2023-02-25 RX ADMIN — Medication SCH MG: at 09:00

## 2023-02-25 RX ADMIN — CLOPIDOGREL BISULFATE SCH MG: 75 TABLET ORAL at 09:13

## 2023-02-25 RX ADMIN — URSODIOL SCH MG: 300 CAPSULE ORAL at 17:21

## 2023-02-25 RX ADMIN — GABAPENTIN SCH MG: 100 CAPSULE ORAL at 09:23

## 2023-02-25 RX ADMIN — PANCRELIPASE LIPASE, PANCRELIPASE AMYLASE, AND PANCRELIPASE PROTEASE SCH EACH: 4200; 24600; 14200 CAPSULE, DELAYED RELEASE ORAL at 09:09

## 2023-02-25 RX ADMIN — FERROUS SULFATE TAB 325 MG (65 MG ELEMENTAL FE) SCH MG: 325 (65 FE) TAB at 09:00

## 2023-02-25 RX ADMIN — TACROLIMUS SCH MG: 0.5 CAPSULE, GELATIN COATED ORAL at 21:40

## 2023-02-25 RX ADMIN — Medication SCH MG: at 09:12

## 2023-02-25 RX ADMIN — DOCUSATE SODIUM SCH MG: 100 CAPSULE, LIQUID FILLED ORAL at 09:11

## 2023-02-25 RX ADMIN — PANTOPRAZOLE SODIUM SCH MG: 40 TABLET, DELAYED RELEASE ORAL at 05:12

## 2023-02-25 RX ADMIN — Medication SCH EACH: at 06:43

## 2023-02-25 RX ADMIN — QUETIAPINE SCH MG: 200 TABLET, FILM COATED ORAL at 09:23

## 2023-02-25 RX ADMIN — DEXTROSE SCH MLS/HR: 50 INJECTION, SOLUTION INTRAVENOUS at 01:39

## 2023-02-25 RX ADMIN — URSODIOL SCH MG: 300 CAPSULE ORAL at 12:36

## 2023-02-25 RX ADMIN — QUETIAPINE SCH MG: 200 TABLET, FILM COATED ORAL at 17:21

## 2023-02-25 RX ADMIN — FERROUS SULFATE TAB 325 MG (65 MG ELEMENTAL FE) SCH MG: 325 (65 FE) TAB at 18:41

## 2023-02-25 RX ADMIN — PANCRELIPASE LIPASE, PANCRELIPASE AMYLASE, AND PANCRELIPASE PROTEASE SCH EACH: 4200; 24600; 14200 CAPSULE, DELAYED RELEASE ORAL at 12:36

## 2023-02-25 RX ADMIN — HEPARIN SODIUM SCH UNITS: 5000 INJECTION, SOLUTION INTRAVENOUS; SUBCUTANEOUS at 10:07

## 2023-02-25 RX ADMIN — DEXTROSE SCH MLS/HR: 50 INJECTION, SOLUTION INTRAVENOUS at 12:36

## 2023-02-25 RX ADMIN — PANCRELIPASE LIPASE, PANCRELIPASE AMYLASE, AND PANCRELIPASE PROTEASE SCH EACH: 4200; 24600; 14200 CAPSULE, DELAYED RELEASE ORAL at 17:20

## 2023-02-25 RX ADMIN — ALLOPURINOL SCH MG: 100 TABLET ORAL at 09:14

## 2023-02-25 RX ADMIN — Medication SCH EACH: at 21:00

## 2023-02-25 RX ADMIN — TRAZODONE HYDROCHLORIDE SCH MG: 50 TABLET ORAL at 21:39

## 2023-02-25 RX ADMIN — DEXAMETHASONE SODIUM PHOSPHATE SCH MG: 4 INJECTION, SOLUTION INTRAMUSCULAR; INTRAVENOUS at 09:14

## 2023-02-25 RX ADMIN — DOCUSATE SODIUM SCH MG: 100 CAPSULE, LIQUID FILLED ORAL at 17:21

## 2023-02-25 RX ADMIN — Medication SCH EA: at 09:13

## 2023-02-25 RX ADMIN — Medication SCH MG: at 09:14

## 2023-02-25 RX ADMIN — Medication PRN MG: at 05:13

## 2023-02-25 RX ADMIN — Medication SCH MG: at 11:56

## 2023-02-25 RX ADMIN — Medication SCH EACH: at 17:20

## 2023-02-25 RX ADMIN — SODIUM CHLORIDE PRN UNIT: 9 INJECTION, SOLUTION INTRAVENOUS at 17:27

## 2023-02-25 RX ADMIN — URSODIOL SCH MG: 300 CAPSULE ORAL at 09:11

## 2023-02-25 RX ADMIN — DEXTROSE SCH MLS/HR: 50 INJECTION, SOLUTION INTRAVENOUS at 09:00

## 2023-02-25 RX ADMIN — TACROLIMUS SCH MG: 0.5 CAPSULE, GELATIN COATED ORAL at 10:08

## 2023-02-26 VITALS — DIASTOLIC BLOOD PRESSURE: 72 MMHG | SYSTOLIC BLOOD PRESSURE: 120 MMHG

## 2023-02-26 VITALS — SYSTOLIC BLOOD PRESSURE: 142 MMHG | DIASTOLIC BLOOD PRESSURE: 58 MMHG

## 2023-02-26 VITALS — DIASTOLIC BLOOD PRESSURE: 54 MMHG | SYSTOLIC BLOOD PRESSURE: 154 MMHG

## 2023-02-26 VITALS — SYSTOLIC BLOOD PRESSURE: 156 MMHG | DIASTOLIC BLOOD PRESSURE: 64 MMHG

## 2023-02-26 VITALS — SYSTOLIC BLOOD PRESSURE: 146 MMHG | DIASTOLIC BLOOD PRESSURE: 46 MMHG

## 2023-02-26 LAB
ALP SERPL-CCNC: 100 U/L (ref 50–136)
ALT SERPL W/O P-5'-P-CCNC: 16 U/L (ref 16–63)
AST SERPL-CCNC: 12 U/L (ref 15–37)
BILIRUB DIRECT SERPL-MCNC: 0.1 MG/DL (ref 0–0.2)
BILIRUB SERPL-MCNC: 0.3 MG/DL (ref 0.2–1)
BUN SERPL-MCNC: 20 MG/DL (ref 7–18)
CHLORIDE SERPL-SCNC: 111 MMOL/L (ref 98–107)
CO2 SERPL-SCNC: 24 MMOL/L (ref 21–32)
CREAT SERPL-MCNC: 1.3 MG/DL (ref 0.6–1.3)
GLUCOSE SERPL-MCNC: 206 MG/DL (ref 74–106)
HCT VFR BLD AUTO: 24.7 % (ref 36.7–47.1)
MAGNESIUM SERPL-MCNC: 1.7 MG/DL (ref 1.8–2.4)
MCH RBC QN AUTO: 21.2 UUG (ref 23.8–33.4)
MCV RBC AUTO: 66.3 FL (ref 73–96.2)
PLATELET # BLD AUTO: 199 K/UL (ref 152–348)
POTASSIUM SERPL-SCNC: 4 MMOL/L (ref 3.5–5.1)
WS STN SPEC: 6.7 G/DL (ref 6.4–8.2)

## 2023-02-26 RX ADMIN — FLUTICASONE FUROATE AND VILANTEROL TRIFENATATE SCH EACH: 100; 25 POWDER RESPIRATORY (INHALATION) at 09:00

## 2023-02-26 RX ADMIN — URSODIOL SCH MG: 300 CAPSULE ORAL at 10:03

## 2023-02-26 RX ADMIN — Medication SCH ML: at 16:53

## 2023-02-26 RX ADMIN — HEPARIN SODIUM SCH UNITS: 5000 INJECTION, SOLUTION INTRAVENOUS; SUBCUTANEOUS at 16:54

## 2023-02-26 RX ADMIN — HEPARIN SODIUM SCH UNITS: 5000 INJECTION, SOLUTION INTRAVENOUS; SUBCUTANEOUS at 10:06

## 2023-02-26 RX ADMIN — Medication SCH MG: at 10:12

## 2023-02-26 RX ADMIN — QUETIAPINE SCH MG: 200 TABLET, FILM COATED ORAL at 16:52

## 2023-02-26 RX ADMIN — PANCRELIPASE LIPASE, PANCRELIPASE AMYLASE, AND PANCRELIPASE PROTEASE SCH EACH: 4200; 24600; 14200 CAPSULE, DELAYED RELEASE ORAL at 11:51

## 2023-02-26 RX ADMIN — Medication SCH MG: at 05:39

## 2023-02-26 RX ADMIN — TACROLIMUS SCH MG: 0.5 CAPSULE, GELATIN COATED ORAL at 11:13

## 2023-02-26 RX ADMIN — PANCRELIPASE LIPASE, PANCRELIPASE AMYLASE, AND PANCRELIPASE PROTEASE SCH EACH: 4200; 24600; 14200 CAPSULE, DELAYED RELEASE ORAL at 16:52

## 2023-02-26 RX ADMIN — Medication SCH EACH: at 11:30

## 2023-02-26 RX ADMIN — FERROUS SULFATE TAB 325 MG (65 MG ELEMENTAL FE) SCH MG: 325 (65 FE) TAB at 10:12

## 2023-02-26 RX ADMIN — Medication SCH GM: at 13:21

## 2023-02-26 RX ADMIN — CLOPIDOGREL BISULFATE SCH MG: 75 TABLET ORAL at 09:53

## 2023-02-26 RX ADMIN — Medication SCH MG: at 19:00

## 2023-02-26 RX ADMIN — QUETIAPINE SCH MG: 200 TABLET, FILM COATED ORAL at 09:53

## 2023-02-26 RX ADMIN — DEXAMETHASONE SODIUM PHOSPHATE SCH MG: 4 INJECTION, SOLUTION INTRAMUSCULAR; INTRAVENOUS at 10:04

## 2023-02-26 RX ADMIN — SODIUM CHLORIDE PRN UNIT: 9 INJECTION, SOLUTION INTRAVENOUS at 12:33

## 2023-02-26 RX ADMIN — DEXTROSE SCH MLS/HR: 50 INJECTION, SOLUTION INTRAVENOUS at 01:28

## 2023-02-26 RX ADMIN — Medication SCH EA: at 10:03

## 2023-02-26 RX ADMIN — Medication SCH EACH: at 06:57

## 2023-02-26 RX ADMIN — Medication SCH GM: at 16:53

## 2023-02-26 RX ADMIN — FERROUS SULFATE TAB 325 MG (65 MG ELEMENTAL FE) SCH MG: 325 (65 FE) TAB at 22:08

## 2023-02-26 RX ADMIN — DOCUSATE SODIUM SCH MG: 100 CAPSULE, LIQUID FILLED ORAL at 16:52

## 2023-02-26 RX ADMIN — DOCUSATE SODIUM SCH MG: 100 CAPSULE, LIQUID FILLED ORAL at 09:54

## 2023-02-26 RX ADMIN — TRAZODONE HYDROCHLORIDE SCH MG: 50 TABLET ORAL at 22:07

## 2023-02-26 RX ADMIN — ACETAMINOPHEN PRN MG: 325 TABLET ORAL at 22:07

## 2023-02-26 RX ADMIN — DOXYCYCLINE HYCLATE SCH MG: 100 TABLET, COATED ORAL at 10:12

## 2023-02-26 RX ADMIN — Medication SCH EACH: at 16:12

## 2023-02-26 RX ADMIN — PANTOPRAZOLE SODIUM SCH MG: 40 TABLET, DELAYED RELEASE ORAL at 06:20

## 2023-02-26 RX ADMIN — Medication SCH MG: at 09:54

## 2023-02-26 RX ADMIN — Medication SCH MG: at 22:07

## 2023-02-26 RX ADMIN — URSODIOL SCH MG: 300 CAPSULE ORAL at 12:29

## 2023-02-26 RX ADMIN — Medication SCH EACH: at 21:00

## 2023-02-26 RX ADMIN — TACROLIMUS SCH MG: 0.5 CAPSULE, GELATIN COATED ORAL at 22:07

## 2023-02-26 RX ADMIN — URSODIOL SCH MG: 300 CAPSULE ORAL at 16:52

## 2023-02-26 RX ADMIN — Medication SCH MG: at 10:02

## 2023-02-26 RX ADMIN — PANCRELIPASE LIPASE, PANCRELIPASE AMYLASE, AND PANCRELIPASE PROTEASE SCH EACH: 4200; 24600; 14200 CAPSULE, DELAYED RELEASE ORAL at 09:57

## 2023-02-26 RX ADMIN — SODIUM CHLORIDE SCH MLS/HR: 9 INJECTION, SOLUTION INTRAVENOUS at 15:20

## 2023-02-26 RX ADMIN — ACETAMINOPHEN PRN MG: 325 TABLET ORAL at 15:26

## 2023-02-26 RX ADMIN — SODIUM CHLORIDE PRN UNIT: 9 INJECTION, SOLUTION INTRAVENOUS at 17:00

## 2023-02-26 RX ADMIN — GABAPENTIN SCH MG: 100 CAPSULE ORAL at 10:02

## 2023-02-26 RX ADMIN — ALLOPURINOL SCH MG: 100 TABLET ORAL at 09:53

## 2023-02-27 VITALS — DIASTOLIC BLOOD PRESSURE: 65 MMHG | SYSTOLIC BLOOD PRESSURE: 136 MMHG

## 2023-02-27 VITALS — SYSTOLIC BLOOD PRESSURE: 145 MMHG | DIASTOLIC BLOOD PRESSURE: 61 MMHG

## 2023-02-27 LAB
BUN SERPL-MCNC: 21 MG/DL (ref 7–18)
CHLORIDE SERPL-SCNC: 110 MMOL/L (ref 98–107)
CO2 SERPL-SCNC: 26 MMOL/L (ref 21–32)
CREAT SERPL-MCNC: 1.2 MG/DL (ref 0.6–1.3)
GLUCOSE SERPL-MCNC: 142 MG/DL (ref 74–106)
HCT VFR BLD AUTO: 25.9 % (ref 36.7–47.1)
MAGNESIUM SERPL-MCNC: 1.6 MG/DL (ref 1.8–2.4)
MCH RBC QN AUTO: 20.8 UUG (ref 23.8–33.4)
MCV RBC AUTO: 66 FL (ref 73–96.2)
PLATELET # BLD AUTO: 236 K/UL (ref 152–348)
POTASSIUM SERPL-SCNC: 3.6 MMOL/L (ref 3.5–5.1)

## 2023-02-27 RX ADMIN — SODIUM CHLORIDE SCH MLS/HR: 9 INJECTION, SOLUTION INTRAVENOUS at 13:23

## 2023-02-27 RX ADMIN — Medication SCH MG: at 06:47

## 2023-02-27 RX ADMIN — PANCRELIPASE LIPASE, PANCRELIPASE AMYLASE, AND PANCRELIPASE PROTEASE SCH EACH: 4200; 24600; 14200 CAPSULE, DELAYED RELEASE ORAL at 12:13

## 2023-02-27 RX ADMIN — Medication SCH GM: at 13:08

## 2023-02-27 RX ADMIN — MAGNESIUM SULFATE IN DEXTROSE SCH MLS/HR: 10 INJECTION, SOLUTION INTRAVENOUS at 13:09

## 2023-02-27 RX ADMIN — GABAPENTIN SCH MG: 100 CAPSULE ORAL at 09:33

## 2023-02-27 RX ADMIN — PANTOPRAZOLE SODIUM SCH MG: 40 TABLET, DELAYED RELEASE ORAL at 06:47

## 2023-02-27 RX ADMIN — FLUTICASONE FUROATE AND VILANTEROL TRIFENATATE SCH EACH: 100; 25 POWDER RESPIRATORY (INHALATION) at 09:35

## 2023-02-27 RX ADMIN — ALLOPURINOL SCH MG: 100 TABLET ORAL at 09:34

## 2023-02-27 RX ADMIN — DEXAMETHASONE SODIUM PHOSPHATE SCH MG: 4 INJECTION, SOLUTION INTRAMUSCULAR; INTRAVENOUS at 09:28

## 2023-02-27 RX ADMIN — PANTOPRAZOLE SODIUM SCH MG: 40 TABLET, DELAYED RELEASE ORAL at 07:00

## 2023-02-27 RX ADMIN — Medication SCH MG: at 12:13

## 2023-02-27 RX ADMIN — URSODIOL SCH MG: 300 CAPSULE ORAL at 09:32

## 2023-02-27 RX ADMIN — Medication SCH ML: at 09:35

## 2023-02-27 RX ADMIN — TACROLIMUS SCH MG: 0.5 CAPSULE, GELATIN COATED ORAL at 09:45

## 2023-02-27 RX ADMIN — CLOPIDOGREL BISULFATE SCH MG: 75 TABLET ORAL at 09:32

## 2023-02-27 RX ADMIN — DOCUSATE SODIUM SCH MG: 100 CAPSULE, LIQUID FILLED ORAL at 09:32

## 2023-02-27 RX ADMIN — Medication SCH EACH: at 07:47

## 2023-02-27 RX ADMIN — URSODIOL SCH MG: 300 CAPSULE ORAL at 12:13

## 2023-02-27 RX ADMIN — Medication SCH GM: at 09:35

## 2023-02-27 RX ADMIN — MAGNESIUM SULFATE IN DEXTROSE SCH MLS/HR: 10 INJECTION, SOLUTION INTRAVENOUS at 12:13

## 2023-02-27 RX ADMIN — SODIUM CHLORIDE PRN UNIT: 9 INJECTION, SOLUTION INTRAVENOUS at 11:55

## 2023-02-27 RX ADMIN — HEPARIN SODIUM SCH UNITS: 5000 INJECTION, SOLUTION INTRAVENOUS; SUBCUTANEOUS at 09:29

## 2023-02-27 RX ADMIN — QUETIAPINE SCH MG: 200 TABLET, FILM COATED ORAL at 09:34

## 2023-02-27 RX ADMIN — PANCRELIPASE LIPASE, PANCRELIPASE AMYLASE, AND PANCRELIPASE PROTEASE SCH EACH: 4200; 24600; 14200 CAPSULE, DELAYED RELEASE ORAL at 09:33

## 2023-02-27 RX ADMIN — Medication SCH EA: at 09:34

## 2023-02-27 RX ADMIN — FERROUS SULFATE TAB 325 MG (65 MG ELEMENTAL FE) SCH MG: 325 (65 FE) TAB at 12:13

## 2023-02-27 RX ADMIN — Medication SCH MG: at 06:00

## 2023-02-27 RX ADMIN — SODIUM CHLORIDE PRN UNIT: 9 INJECTION, SOLUTION INTRAVENOUS at 08:25

## 2023-02-27 RX ADMIN — Medication SCH MG: at 09:34

## 2023-02-27 RX ADMIN — SODIUM CHLORIDE SCH MLS/HR: 9 INJECTION, SOLUTION INTRAVENOUS at 14:00

## 2023-02-27 RX ADMIN — Medication SCH EACH: at 11:52

## 2023-02-27 RX ADMIN — Medication SCH MG: at 09:33

## 2023-02-27 NOTE — NUR
pt got picked up by his friend Joana and he was stable at discharge. No pain, no SOB or 
other distress.